# Patient Record
Sex: MALE | Race: WHITE
[De-identification: names, ages, dates, MRNs, and addresses within clinical notes are randomized per-mention and may not be internally consistent; named-entity substitution may affect disease eponyms.]

---

## 2022-11-17 ENCOUNTER — HOSPITAL ENCOUNTER (OUTPATIENT)
Dept: HOSPITAL 95 - ORSCMMR | Age: 77
Discharge: HOME | End: 2022-11-17
Attending: SURGERY
Payer: OTHER GOVERNMENT

## 2022-11-17 VITALS — HEIGHT: 68 IN | WEIGHT: 151.02 LBS | BODY MASS INDEX: 22.89 KG/M2

## 2022-11-17 DIAGNOSIS — E78.00: ICD-10-CM

## 2022-11-17 DIAGNOSIS — I25.10: ICD-10-CM

## 2022-11-17 DIAGNOSIS — I10: ICD-10-CM

## 2022-11-17 DIAGNOSIS — K21.9: ICD-10-CM

## 2022-11-17 DIAGNOSIS — L72.0: Primary | ICD-10-CM

## 2022-11-17 DIAGNOSIS — Z87.891: ICD-10-CM

## 2022-11-17 DIAGNOSIS — D23.5: ICD-10-CM

## 2022-11-17 DIAGNOSIS — Z79.899: ICD-10-CM

## 2022-11-17 PROCEDURE — 0JB50ZX EXCISION OF LEFT NECK SUBCUTANEOUS TISSUE AND FASCIA, OPEN APPROACH, DIAGNOSTIC: ICD-10-PCS | Performed by: SURGERY

## 2022-11-17 PROCEDURE — 0JB70ZX EXCISION OF BACK SUBCUTANEOUS TISSUE AND FASCIA, OPEN APPROACH, DIAGNOSTIC: ICD-10-PCS | Performed by: SURGERY

## 2022-11-17 PROCEDURE — 0JB40ZX EXCISION OF RIGHT NECK SUBCUTANEOUS TISSUE AND FASCIA, OPEN APPROACH, DIAGNOSTIC: ICD-10-PCS | Performed by: SURGERY

## 2022-11-17 PROCEDURE — A9270 NON-COVERED ITEM OR SERVICE: HCPCS

## 2022-11-17 NOTE — NUR
Ambulatory in Day Surgery
History, Chart, Medications and Allergies reviewed before start of
procedure.
Lungs clear anteriorly to Auscultation.
Pre-Op teaching done. Pt verbalizes understanding.
Fall risk prevention educated on post surgery.

## 2022-11-17 NOTE — NUR
PT TOLERATING PO. DENIES N/V,SOB,CP. VSS. PT DRESSING REMAIN WNL. PT SENT WITH
ICE PACK AS HE STATED NOT HAVING ONE AT HOME.